# Patient Record
Sex: FEMALE | Race: WHITE | Employment: FULL TIME | URBAN - METROPOLITAN AREA
[De-identification: names, ages, dates, MRNs, and addresses within clinical notes are randomized per-mention and may not be internally consistent; named-entity substitution may affect disease eponyms.]

---

## 2024-10-11 ENCOUNTER — HOSPITAL ENCOUNTER (EMERGENCY)
Age: 54
Discharge: HOME | End: 2024-10-11
Attending: STUDENT IN AN ORGANIZED HEALTH CARE EDUCATION/TRAINING PROGRAM
Payer: COMMERCIAL

## 2024-10-11 VITALS
HEART RATE: 77 BPM | BODY MASS INDEX: 32.64 KG/M2 | RESPIRATION RATE: 18 BRPM | WEIGHT: 184.2 LBS | OXYGEN SATURATION: 97 % | HEIGHT: 63 IN | TEMPERATURE: 98.1 F | SYSTOLIC BLOOD PRESSURE: 126 MMHG | DIASTOLIC BLOOD PRESSURE: 64 MMHG

## 2024-10-11 DIAGNOSIS — S01.91XA LACERATION OF HEAD WITHOUT FOREIGN BODY, UNSPECIFIED PART OF HEAD, INITIAL ENCOUNTER: Primary | ICD-10-CM

## 2024-10-11 PROCEDURE — 6370000000 HC RX 637 (ALT 250 FOR IP)

## 2024-10-11 PROCEDURE — 90471 IMMUNIZATION ADMIN: CPT

## 2024-10-11 PROCEDURE — 6360000002 HC RX W HCPCS

## 2024-10-11 PROCEDURE — 90715 TDAP VACCINE 7 YRS/> IM: CPT

## 2024-10-11 PROCEDURE — 12001 RPR S/N/AX/GEN/TRNK 2.5CM/<: CPT

## 2024-10-11 PROCEDURE — 99284 EMERGENCY DEPT VISIT MOD MDM: CPT

## 2024-10-11 PROCEDURE — 2500000003 HC RX 250 WO HCPCS

## 2024-10-11 RX ORDER — LIDOCAINE HYDROCHLORIDE AND EPINEPHRINE 10; 10 MG/ML; UG/ML
20 INJECTION, SOLUTION INFILTRATION; PERINEURAL ONCE
Status: COMPLETED | OUTPATIENT
Start: 2024-10-11 | End: 2024-10-11

## 2024-10-11 RX ORDER — BACITRACIN ZINC AND POLYMYXIN B SULFATE 500; 1000 [USP'U]/G; [USP'U]/G
OINTMENT TOPICAL ONCE
Status: COMPLETED | OUTPATIENT
Start: 2024-10-11 | End: 2024-10-11

## 2024-10-11 RX ORDER — ACETAMINOPHEN 500 MG
1000 TABLET ORAL ONCE
Status: COMPLETED | OUTPATIENT
Start: 2024-10-11 | End: 2024-10-11

## 2024-10-11 RX ORDER — OXYCODONE HYDROCHLORIDE 5 MG/1
5 TABLET ORAL ONCE
Status: COMPLETED | OUTPATIENT
Start: 2024-10-11 | End: 2024-10-11

## 2024-10-11 RX ADMIN — TETANUS TOXOID, REDUCED DIPHTHERIA TOXOID AND ACELLULAR PERTUSSIS VACCINE, ADSORBED 0.5 ML: 5; 2.5; 8; 8; 2.5 SUSPENSION INTRAMUSCULAR at 20:09

## 2024-10-11 RX ADMIN — Medication 1 G: at 20:54

## 2024-10-11 RX ADMIN — LIDOCAINE HYDROCHLORIDE,EPINEPHRINE BITARTRATE 20 ML: 10; .01 INJECTION, SOLUTION INFILTRATION; PERINEURAL at 20:53

## 2024-10-11 RX ADMIN — OXYCODONE HYDROCHLORIDE 5 MG: 5 TABLET ORAL at 20:21

## 2024-10-11 RX ADMIN — ACETAMINOPHEN 1000 MG: 500 TABLET ORAL at 20:20

## 2024-10-11 ASSESSMENT — PAIN SCALES - GENERAL
PAINLEVEL_OUTOF10: 5
PAINLEVEL_OUTOF10: 6
PAINLEVEL_OUTOF10: 5

## 2024-10-11 ASSESSMENT — PAIN DESCRIPTION - LOCATION
LOCATION: HEAD

## 2024-10-11 NOTE — ED PROVIDER NOTES
ED Attending Attestation Note     Date of evaluation: 10/11/2024    This patient was seen by the resident.  I have seen and examined the patient, agree with the workup, evaluation, management and diagnosis. The care plan has been discussed.  My assessment reveals a woman with a fall today. No loss of consciousness. No vomiting. She did strike her head. She noted blood at her scalp. Tdap within last 10 years. No significant headache. No anticoagulation or antiplatelet agents. On exam    General:  Well appearing. No acute distress.  Non-toxic appearing     HEENT: Head with 1cm laceration to occiput with NO surrounding area of edema instability or depression, no Qureshi's sign or Raccoon eyes, pupils equal round and reactive to light, EOMI, sclera clear, no facial tenderness to palpation or step offs, no midface instability, no hemotympanum bilaterally, mucus membranes moist, no trismus, no jaw malocclusion, oropharynx WNL     Neck:  Supple. Full ROM without pain.    Pulmonary:   Non-labored breathing. Breath sounds clear bilaterally.    Cardiac:  Regular rate and rhythm. No murmurs.     Abdomen:  Soft. Non-tender. Non-distended. No masses.    Musculoskeletal: No obvious deformities, no tenderness to palpation, no mildine C, T or L spine tenderness to palpation, full neck ROM without pain, full ROM in all extremities with no pain     Vascular:  Extremities warm and perfused.  Radial pulses 2+ bilaterally.  Dorsalis pedis pulses 2+ bilaterally.    Skin:  No rash. No other lacerations or abrasions.    Neuro: GCS15, AAOx4. CN 2-12 intact. Normal gait. Sensation intact. Strength grossly equal and symmetric.    Extremities:  No peripheral edema. BLE symmetric.     However, given no LOC and GCS15 and reassuring exam findings as above, do not feel that CT head is indicated and my suspicion for intracranial hemorrhage, skull fracture is very low. Discussed this with patient who agrees in shared decision-making fashion not to  proceed with any Ct imaging    Her Cervical spine was cleared clinically.         Chong Leonard MD  10/11/24 2025

## 2024-10-12 NOTE — DISCHARGE INSTRUCTIONS
You were seen in the ED after head laceration.  You had 1 staple placed.  Please see your primary care doctor in about 7 days to have the staple removed.  You can take Tylenol for pain as needed.  For the next 2 weeks please avoid taking NSAIDs such as Motrin or ibuprofen, naproxen or Aleve.  In the event that you develop persistent headaches I do not improve with headache, nausea, vomiting, loss of consciousness, seizure-like activity please return to the ED.    You received Tylenol and oxycodone for pain today.  He also received a shot of tetanus.    Please keep the wound clean and dry at all times.  You may apply some bacitracin over the wounds daily.

## 2024-10-12 NOTE — ED PROVIDER NOTES
THE [facility]  EMERGENCY DEPARTMENT ENCOUNTER          EM RESIDENT NOTE       Date of evaluation: 10/11/2024    Chief Complaint     Head Laceration (Pt was sitting at the edge of her bathtub and slide off, hit her head on the soap dish. PT heard a crunch and felt her head hurt and noticed blood)      History of Present Illness     Jaylin Angel is a 54 y.o. female who presents to the ED with a concern of a laceration to her head.  As per the patient she was sitting down at the edge of her bed tub while she was trying to change a toilet seat.  She accidentally slipped backwards and hit her head against a soap dish.  She states that she heard a crunching sound in the head which concerned her.  She then called her neighbor to bring her to the hospital.  Denies any loss of consciousness.  No neck pain.  No spinal pain.  No dizziness, nausea, vomiting.  No amnesia to the event.  She is not on blood thinners.  She denies having pain or discomfort in any other joints or parts of her body.    MEDICAL DECISION MAKING / ASSESSMENT / PLAN     INITIAL VITALS: BP: 126/64, Temp: 98.1 °F (36.7 °C), Pulse: 77, Respirations: 18, SpO2: 97 %    Jaylin Angel is a 54 y.o. female coming to the ED with a head laceration.  On physical exam the patient has a 1 cm laceration to the back of her head around the occipital region.  The wound is slowly oozing.  The wound does not violate galea.  It is no more than 1 to 2 mm deep.  No palpable step-offs along the skull.      GCS 15 on arrival  ABCs appear intact    Secondary assessment unremarkable for any other injuries or deficits except for the 1 cm head laceration.    Shared decision making for CT scan Noncon today of the head.  Patient opted out of CT head Noncon.  Tetanus shot given in the ED..  Tylenol and oxycodone for pain control.    I went ahead and repaired the laceration with 1 staple.  Return precautions given to the ED.  The patient was counseled on following up with her  has no past medical history on file.  She has no past surgical history on file.  Her family history is not on file.  She     Medications     Previous Medications    No medications on file       Allergies     She has No Known Allergies.    Physical Exam     INITIAL VITALS: BP: 126/64, Temp: 98.1 °F (36.7 °C), Pulse: 77, Respirations: 18, SpO2: 97 %   Physical Exam  Constitutional:       Appearance: Normal appearance.   HENT:      Head: Normocephalic.        Comments: There is 1 cm laceration to the occiput   Eyes:      Extraocular Movements: Extraocular movements intact.      Pupils: Pupils are equal, round, and reactive to light.   Cardiovascular:      Rate and Rhythm: Normal rate and regular rhythm.      Heart sounds: No murmur heard.     No friction rub.   Pulmonary:      Effort: Pulmonary effort is normal.      Breath sounds: Normal breath sounds.   Abdominal:      General: There is no distension.      Palpations: There is no mass.   Musculoskeletal:         General: No swelling, tenderness, deformity or signs of injury.      Cervical back: Normal range of motion and neck supple. No rigidity or tenderness.      Left lower leg: No edema.   Skin:     Capillary Refill: Capillary refill takes less than 2 seconds.      Coloration: Skin is not jaundiced or pale.      Findings: No bruising, erythema, lesion or rash.   Neurological:      General: No focal deficit present.      Mental Status: She is alert and oriented to person, place, and time.      Cranial Nerves: No cranial nerve deficit.      Sensory: No sensory deficit.      Motor: No weakness.      Coordination: Coordination normal.      Gait: Gait normal.      Deep Tendon Reflexes: Reflexes normal.   Psychiatric:         Mood and Affect: Mood normal.         Behavior: Behavior normal.               Denice Gould MD  Resident  10/11/24 2055